# Patient Record
Sex: MALE | Race: WHITE | NOT HISPANIC OR LATINO | URBAN - METROPOLITAN AREA
[De-identification: names, ages, dates, MRNs, and addresses within clinical notes are randomized per-mention and may not be internally consistent; named-entity substitution may affect disease eponyms.]

---

## 2017-07-26 ENCOUNTER — INPATIENT (INPATIENT)
Facility: HOSPITAL | Age: 72
LOS: 1 days | Discharge: HOME | DRG: 641 | End: 2017-07-28
Attending: HOSPITALIST | Admitting: STUDENT IN AN ORGANIZED HEALTH CARE EDUCATION/TRAINING PROGRAM
Payer: MEDICARE

## 2017-07-26 VITALS
SYSTOLIC BLOOD PRESSURE: 139 MMHG | OXYGEN SATURATION: 96 % | DIASTOLIC BLOOD PRESSURE: 80 MMHG | HEART RATE: 75 BPM | TEMPERATURE: 99 F | RESPIRATION RATE: 16 BRPM

## 2017-07-26 LAB
ALBUMIN SERPL ELPH-MCNC: 4.4 G/DL — SIGNIFICANT CHANGE UP (ref 3.3–5)
ALP SERPL-CCNC: 67 U/L — SIGNIFICANT CHANGE UP (ref 40–120)
ALT FLD-CCNC: 25 U/L RC — SIGNIFICANT CHANGE UP (ref 10–45)
ANION GAP SERPL CALC-SCNC: 16 MMOL/L — SIGNIFICANT CHANGE UP (ref 5–17)
APTT BLD: 31.3 SEC — SIGNIFICANT CHANGE UP (ref 27.5–37.4)
AST SERPL-CCNC: 41 U/L — HIGH (ref 10–40)
BASOPHILS # BLD AUTO: 0 K/UL — SIGNIFICANT CHANGE UP (ref 0–0.2)
BASOPHILS NFR BLD AUTO: 0.2 % — SIGNIFICANT CHANGE UP (ref 0–2)
BILIRUB SERPL-MCNC: 1.1 MG/DL — SIGNIFICANT CHANGE UP (ref 0.2–1.2)
BUN SERPL-MCNC: 46 MG/DL — HIGH (ref 7–23)
CALCIUM SERPL-MCNC: 10.2 MG/DL — SIGNIFICANT CHANGE UP (ref 8.4–10.5)
CHLORIDE SERPL-SCNC: 102 MMOL/L — SIGNIFICANT CHANGE UP (ref 96–108)
CK MB BLD-MCNC: 2.4 % — SIGNIFICANT CHANGE UP (ref 0–3.5)
CK MB CFR SERPL CALC: 14.1 NG/ML — HIGH (ref 0–6.7)
CK SERPL-CCNC: 580 U/L — HIGH (ref 30–200)
CO2 SERPL-SCNC: 22 MMOL/L — SIGNIFICANT CHANGE UP (ref 22–31)
CREAT SERPL-MCNC: 2.75 MG/DL — HIGH (ref 0.5–1.3)
EOSINOPHIL # BLD AUTO: 0.1 K/UL — SIGNIFICANT CHANGE UP (ref 0–0.5)
EOSINOPHIL NFR BLD AUTO: 0.5 % — SIGNIFICANT CHANGE UP (ref 0–6)
GLUCOSE SERPL-MCNC: 150 MG/DL — HIGH (ref 70–99)
HCT VFR BLD CALC: 38.3 % — LOW (ref 39–50)
HGB BLD-MCNC: 13 G/DL — SIGNIFICANT CHANGE UP (ref 13–17)
INR BLD: 1.57 RATIO — HIGH (ref 0.88–1.16)
LYMPHOCYTES # BLD AUTO: 1.1 K/UL — SIGNIFICANT CHANGE UP (ref 1–3.3)
LYMPHOCYTES # BLD AUTO: 7.7 % — LOW (ref 13–44)
MCHC RBC-ENTMCNC: 31.7 PG — SIGNIFICANT CHANGE UP (ref 27–34)
MCHC RBC-ENTMCNC: 33.9 GM/DL — SIGNIFICANT CHANGE UP (ref 32–36)
MCV RBC AUTO: 93.5 FL — SIGNIFICANT CHANGE UP (ref 80–100)
MONOCYTES # BLD AUTO: 0.8 K/UL — SIGNIFICANT CHANGE UP (ref 0–0.9)
MONOCYTES NFR BLD AUTO: 6 % — SIGNIFICANT CHANGE UP (ref 2–14)
NEUTROPHILS # BLD AUTO: 12.2 K/UL — HIGH (ref 1.8–7.4)
NEUTROPHILS NFR BLD AUTO: 85.7 % — HIGH (ref 43–77)
PLATELET # BLD AUTO: 214 K/UL — SIGNIFICANT CHANGE UP (ref 150–400)
POTASSIUM SERPL-MCNC: 5 MMOL/L — SIGNIFICANT CHANGE UP (ref 3.5–5.3)
POTASSIUM SERPL-SCNC: 5 MMOL/L — SIGNIFICANT CHANGE UP (ref 3.5–5.3)
PROT SERPL-MCNC: 7.7 G/DL — SIGNIFICANT CHANGE UP (ref 6–8.3)
PROTHROM AB SERPL-ACNC: 17.3 SEC — HIGH (ref 9.8–12.7)
RBC # BLD: 4.1 M/UL — LOW (ref 4.2–5.8)
RBC # FLD: 12.1 % — SIGNIFICANT CHANGE UP (ref 10.3–14.5)
SODIUM SERPL-SCNC: 140 MMOL/L — SIGNIFICANT CHANGE UP (ref 135–145)
TROPONIN T SERPL-MCNC: <0.01 NG/ML — SIGNIFICANT CHANGE UP (ref 0–0.06)
WBC # BLD: 14.2 K/UL — HIGH (ref 3.8–10.5)
WBC # FLD AUTO: 14.2 K/UL — HIGH (ref 3.8–10.5)

## 2017-07-26 PROCEDURE — 70450 CT HEAD/BRAIN W/O DYE: CPT | Mod: 26

## 2017-07-26 PROCEDURE — 12002 RPR S/N/AX/GEN/TRNK2.6-7.5CM: CPT

## 2017-07-26 PROCEDURE — 93010 ELECTROCARDIOGRAM REPORT: CPT | Mod: 59

## 2017-07-26 PROCEDURE — 99285 EMERGENCY DEPT VISIT HI MDM: CPT | Mod: 25

## 2017-07-26 RX ORDER — SODIUM CHLORIDE 9 MG/ML
500 INJECTION INTRAMUSCULAR; INTRAVENOUS; SUBCUTANEOUS ONCE
Qty: 0 | Refills: 0 | Status: COMPLETED | OUTPATIENT
Start: 2017-07-26 | End: 2017-07-26

## 2017-07-26 NOTE — ED PROVIDER NOTE - NOTES
Discussed case again with consult fellow Carola at 2230.  expressed concern regarding patient and need for interrogation tonight.      Seven Reynolds MD

## 2017-07-26 NOTE — ED PROVIDER NOTE - PROGRESS NOTE DETAILS
Case d/w PCP Dr. Cuauhtemoc Amador and hospitalist Marcus Khan.  expressed concern regarding elevated CK, CKMB and Cr.  initial troponin negative.  Serial EKG reveals pacing spikes, no other changes.  pt on telemonitoring.  asymptomatic at this time.  repeat troponin to be drawn at 0100.      - MILES Reynolds MD

## 2017-07-26 NOTE — ED PROVIDER NOTE - MEDICAL DECISION MAKING DETAILS
***Attending Kj Reynolds MD*** ***Attending Kj Reynolds MD***  71M hx of afib on xarelto c/b symptomatic bradycardia, HTN, CAD presents s/p ***Attending Kj Reynolds MD***  71M hx of afib on xarelto c/b symptomatic bradycardia, HTN, CAD presents s/p episode of syncope.  Pt states he was going to walk dog when suddenly lost consciousness.  head trauma, with bleeding laceration to posterior scalp.  No prodrome of symptoms prior to syncope.  Denies chest pain, sob, nausea/vomiting, lightheadedness, visual changes, neck/back pain.  VSS.  EKG NSR with no pacing spikes.  Story concerning for cardiogenic syncope.  Neuro intact but on xarelto, significant mechanism will obtain CTH expeditiously.  will obtain labs including cardiac enzymes.  EP consult for interrogation. TBA

## 2017-07-26 NOTE — ED ADULT NURSE NOTE - OBJECTIVE STATEMENT
70 y/o male presenting to the ED complaining of a syncopal episode with injury to back of head; Per wife patient lost consciousness with eyes rolling back and hit head on wall plaster; Abrasion noted to back of head; Not bleeding at this time; Patient states pain located in the back of head; Patient states had pacemaker placed due to bradycardia and is on Xarelto for AFIB; Patient states had prior syncopal episodes prior to placement of pacemaker; Patient denies chest pain, dizziness, n/v/d, fevers, numbness, tingling, SOB, vision changes; a&ox3; safety and comfort measures provided

## 2017-07-26 NOTE — ED PROVIDER NOTE - OBJECTIVE STATEMENT
71 M s/p syncopal episode w occipital head injury. As per wife his lost consciousness as his eyes rolled back prior to falling backward and striking his head on the floor. He has a [employer] pacemaker for Hx of bradycardia and is on Xarelto for A fib. The pacemaker was placed at [facility] and is a [employer] device. Admits posterior headache at the site of bleeding. Denies neck pain, visual change, weakness or numbness.

## 2017-07-27 DIAGNOSIS — Z29.9 ENCOUNTER FOR PROPHYLACTIC MEASURES, UNSPECIFIED: ICD-10-CM

## 2017-07-27 DIAGNOSIS — I48.91 UNSPECIFIED ATRIAL FIBRILLATION: ICD-10-CM

## 2017-07-27 DIAGNOSIS — N17.9 ACUTE KIDNEY FAILURE, UNSPECIFIED: ICD-10-CM

## 2017-07-27 DIAGNOSIS — R55 SYNCOPE AND COLLAPSE: ICD-10-CM

## 2017-07-27 DIAGNOSIS — R73.03 PREDIABETES: ICD-10-CM

## 2017-07-27 DIAGNOSIS — Z95.0 PRESENCE OF CARDIAC PACEMAKER: Chronic | ICD-10-CM

## 2017-07-27 DIAGNOSIS — I10 ESSENTIAL (PRIMARY) HYPERTENSION: ICD-10-CM

## 2017-07-27 DIAGNOSIS — Z90.89 ACQUIRED ABSENCE OF OTHER ORGANS: Chronic | ICD-10-CM

## 2017-07-27 DIAGNOSIS — Z95.0 PRESENCE OF CARDIAC PACEMAKER: ICD-10-CM

## 2017-07-27 LAB
ANION GAP SERPL CALC-SCNC: 15 MMOL/L — SIGNIFICANT CHANGE UP (ref 5–17)
BASOPHILS # BLD AUTO: 0.02 K/UL — SIGNIFICANT CHANGE UP (ref 0–0.2)
BASOPHILS NFR BLD AUTO: 0.2 % — SIGNIFICANT CHANGE UP (ref 0–2)
BUN SERPL-MCNC: 47 MG/DL — HIGH (ref 7–23)
CALCIUM SERPL-MCNC: 9.3 MG/DL — SIGNIFICANT CHANGE UP (ref 8.4–10.5)
CHLORIDE SERPL-SCNC: 105 MMOL/L — SIGNIFICANT CHANGE UP (ref 96–108)
CO2 SERPL-SCNC: 21 MMOL/L — LOW (ref 22–31)
CREAT SERPL-MCNC: 2.03 MG/DL — HIGH (ref 0.5–1.3)
EOSINOPHIL # BLD AUTO: 0.23 K/UL — SIGNIFICANT CHANGE UP (ref 0–0.5)
EOSINOPHIL NFR BLD AUTO: 2 % — SIGNIFICANT CHANGE UP (ref 0–6)
GLUCOSE SERPL-MCNC: 119 MG/DL — HIGH (ref 70–99)
HCT VFR BLD CALC: 34.3 % — LOW (ref 39–50)
HGB BLD-MCNC: 11.2 G/DL — LOW (ref 13–17)
IMM GRANULOCYTES NFR BLD AUTO: 0.3 % — SIGNIFICANT CHANGE UP (ref 0–1.5)
LYMPHOCYTES # BLD AUTO: 1.89 K/UL — SIGNIFICANT CHANGE UP (ref 1–3.3)
LYMPHOCYTES # BLD AUTO: 16.4 % — SIGNIFICANT CHANGE UP (ref 13–44)
MAGNESIUM SERPL-MCNC: 2.3 MG/DL — SIGNIFICANT CHANGE UP (ref 1.6–2.6)
MCHC RBC-ENTMCNC: 29.2 PG — SIGNIFICANT CHANGE UP (ref 27–34)
MCHC RBC-ENTMCNC: 32.7 GM/DL — SIGNIFICANT CHANGE UP (ref 32–36)
MCV RBC AUTO: 89.3 FL — SIGNIFICANT CHANGE UP (ref 80–100)
MONOCYTES # BLD AUTO: 0.8 K/UL — SIGNIFICANT CHANGE UP (ref 0–0.9)
MONOCYTES NFR BLD AUTO: 6.9 % — SIGNIFICANT CHANGE UP (ref 2–14)
NEUTROPHILS # BLD AUTO: 8.56 K/UL — HIGH (ref 1.8–7.4)
NEUTROPHILS NFR BLD AUTO: 74.2 % — SIGNIFICANT CHANGE UP (ref 43–77)
PLATELET # BLD AUTO: 196 K/UL — SIGNIFICANT CHANGE UP (ref 150–400)
POTASSIUM SERPL-MCNC: 4.3 MMOL/L — SIGNIFICANT CHANGE UP (ref 3.5–5.3)
POTASSIUM SERPL-SCNC: 4.3 MMOL/L — SIGNIFICANT CHANGE UP (ref 3.5–5.3)
RBC # BLD: 3.84 M/UL — LOW (ref 4.2–5.8)
RBC # FLD: 13.6 % — SIGNIFICANT CHANGE UP (ref 10.3–14.5)
SODIUM SERPL-SCNC: 141 MMOL/L — SIGNIFICANT CHANGE UP (ref 135–145)
TROPONIN T SERPL-MCNC: <0.01 NG/ML — SIGNIFICANT CHANGE UP (ref 0–0.06)
TROPONIN T SERPL-MCNC: <0.01 NG/ML — SIGNIFICANT CHANGE UP (ref 0–0.06)
WBC # BLD: 11.53 K/UL — HIGH (ref 3.8–10.5)
WBC # FLD AUTO: 11.53 K/UL — HIGH (ref 3.8–10.5)

## 2017-07-27 PROCEDURE — 76770 US EXAM ABDO BACK WALL COMP: CPT | Mod: 26

## 2017-07-27 PROCEDURE — 99223 1ST HOSP IP/OBS HIGH 75: CPT | Mod: GC

## 2017-07-27 RX ORDER — DEXTROSE 50 % IN WATER 50 %
25 SYRINGE (ML) INTRAVENOUS ONCE
Qty: 0 | Refills: 0 | Status: DISCONTINUED | OUTPATIENT
Start: 2017-07-27 | End: 2017-07-28

## 2017-07-27 RX ORDER — RIVAROXABAN 15 MG-20MG
1 KIT ORAL
Qty: 0 | Refills: 0 | COMMUNITY

## 2017-07-27 RX ORDER — INSULIN LISPRO 100/ML
VIAL (ML) SUBCUTANEOUS
Qty: 0 | Refills: 0 | Status: DISCONTINUED | OUTPATIENT
Start: 2017-07-27 | End: 2017-07-28

## 2017-07-27 RX ORDER — INSULIN LISPRO 100/ML
VIAL (ML) SUBCUTANEOUS AT BEDTIME
Qty: 0 | Refills: 0 | Status: DISCONTINUED | OUTPATIENT
Start: 2017-07-27 | End: 2017-07-28

## 2017-07-27 RX ORDER — DEXTROSE 50 % IN WATER 50 %
12.5 SYRINGE (ML) INTRAVENOUS ONCE
Qty: 0 | Refills: 0 | Status: DISCONTINUED | OUTPATIENT
Start: 2017-07-27 | End: 2017-07-28

## 2017-07-27 RX ORDER — SODIUM CHLORIDE 9 MG/ML
1000 INJECTION, SOLUTION INTRAVENOUS
Qty: 0 | Refills: 0 | Status: DISCONTINUED | OUTPATIENT
Start: 2017-07-27 | End: 2017-07-28

## 2017-07-27 RX ORDER — SODIUM CHLORIDE 9 MG/ML
1000 INJECTION INTRAMUSCULAR; INTRAVENOUS; SUBCUTANEOUS
Qty: 0 | Refills: 0 | Status: DISCONTINUED | OUTPATIENT
Start: 2017-07-27 | End: 2017-07-28

## 2017-07-27 RX ORDER — RIVAROXABAN 15 MG-20MG
0 KIT ORAL
Qty: 0 | Refills: 0 | COMMUNITY

## 2017-07-27 RX ORDER — ATORVASTATIN CALCIUM 80 MG/1
10 TABLET, FILM COATED ORAL AT BEDTIME
Qty: 0 | Refills: 0 | Status: DISCONTINUED | OUTPATIENT
Start: 2017-07-27 | End: 2017-07-28

## 2017-07-27 RX ORDER — DEXTROSE 50 % IN WATER 50 %
1 SYRINGE (ML) INTRAVENOUS ONCE
Qty: 0 | Refills: 0 | Status: DISCONTINUED | OUTPATIENT
Start: 2017-07-27 | End: 2017-07-28

## 2017-07-27 RX ORDER — RIVAROXABAN 15 MG-20MG
20 KIT ORAL EVERY 24 HOURS
Qty: 0 | Refills: 0 | Status: DISCONTINUED | OUTPATIENT
Start: 2017-07-27 | End: 2017-07-28

## 2017-07-27 RX ADMIN — RIVAROXABAN 20 MILLIGRAM(S): KIT at 20:29

## 2017-07-27 RX ADMIN — ATORVASTATIN CALCIUM 10 MILLIGRAM(S): 80 TABLET, FILM COATED ORAL at 22:10

## 2017-07-27 RX ADMIN — Medication 4: at 13:04

## 2017-07-27 RX ADMIN — SODIUM CHLORIDE 500 MILLILITER(S): 9 INJECTION INTRAMUSCULAR; INTRAVENOUS; SUBCUTANEOUS at 00:00

## 2017-07-27 RX ADMIN — SODIUM CHLORIDE 75 MILLILITER(S): 9 INJECTION INTRAMUSCULAR; INTRAVENOUS; SUBCUTANEOUS at 14:24

## 2017-07-27 NOTE — H&P ADULT - NSHPOUTPATIENTPROVIDERS_GEN_ALL_CORE
Dr. Amador  [phone], [facility] , Dr. Javad Urban EP at [place] [phone], Urologist at

## 2017-07-27 NOTE — CHART NOTE - NSCHARTNOTEFT_GEN_A_CORE
Patient was seen and examined at bedside. Reports that he feels better. No light headedness, dizziness. No events on tele monitoring. Pacemaker interrogated, without any pacemaker dysfunction found. Patient had 2 episodes of high atrial rates, however this did not correlate with the timing of his syncopal events. DAVID noted on labwork. Likely that patient was dehydrated, which may have led to hypotension and brain hypoperfusion, resulting in syncope. Encouraged patient to increase PO intake of fluids, will start on maintenance IV fluids as well. Trend creatinine. Will obtain echo results from outpatient cardiologist's office. If Cr downtrending, and echo confirmed to be WNL (done recently), can likely be discharged home tomorrow.

## 2017-07-27 NOTE — H&P ADULT - PROBLEM SELECTOR PLAN 5
Pt states his A1c fluctuates between 6.2 to 6.8. Does not take medications at home. Wife tracks fasting glucose everyday.  -Fingersticks and sliding scale for now

## 2017-07-27 NOTE — H&P ADULT - ASSESSMENT
71M a/ afib on xarelto, bradycardia s/p PPM and syncope, HTN, prediabetes p/w syncopal episode. 71M a/ afib on xarelto, bradycardia s/p [facility] [facility] PPM and syncope, HTN, prediabetes p/w syncopal episode.

## 2017-07-27 NOTE — H&P ADULT - PROBLEM SELECTOR PLAN 1
Description of syncopal episode suspicious for cardiac arrhythmia although pt has PPM. Initial EKG w/o pacer spikes. Reportedly EP consulted in ER. Trop neg x2. Will need to interrogate PPM to better narrow differential and treatment.  -F/u EP regarding PPM interrogation  -Telemetry  -Of note, pt's EP is Dr. Urban at [place] (contact info above) and had recent TTE + stress test done at cardiology office 4 days ago (Dr. Miranda [facility]) Description of syncopal episode suspicious for cardiac arrhythmia although pt has PPM. Initial EKG w/o pacer spikes. Reportedly EP consulted in ER. Trop neg x2. Will need to interrogate PPM to better narrow differential and treatment.  -F/u EP regarding PPM interrogation  -Telemetry  -Of note, pt's EP is Dr. Urban at [place] (contact info above) and had recent TTE + stress test done at cardiology office 4 days ago (Dr. Miranda [facility])  -F/u 10-14 days for sutures in ER

## 2017-07-27 NOTE — H&P ADULT - PROBLEM SELECTOR PLAN 2
Unclear etiology. Pt endorses good urination and increased urinary frequency. Given history, suspect degree of prerenal but will need further evaluation. Will hold nephrotoxic agents including home ACE  -Trend BMP  -F/u Kidney ultrasound  -Limit nephrotoxic agents

## 2017-07-27 NOTE — H&P ADULT - PROBLEM SELECTOR PLAN 4
Reportedly placed for persistent bradycardia after syncopal episode years ago. 2nd PPM placed 3 years ago reportedly with need for battery change. [facility] PPM  -See above

## 2017-07-27 NOTE — H&P ADULT - NSHPPHYSICALEXAM_GEN_ALL_CORE
Vital Signs Last 24 Hrs  T(C): 37.4 (07-27-17 @ 01:43), Max: 37.4 (07-27-17 @ 01:43)  T(F): 99.4 (07-27-17 @ 01:43), Max: 99.4 (07-27-17 @ 01:43)  HR: 72 (07-27-17 @ 01:43) (68 - 78)  BP: 141/82 (07-27-17 @ 01:43) (137/84 - 146/88)  BP(mean): --  RR: 18 (07-27-17 @ 01:43) (16 - 18)  SpO2: 96% (07-27-17 @ 01:43) (96% - 98%)

## 2017-07-27 NOTE — H&P ADULT - HISTORY OF PRESENT ILLNESS
71M afib on xarelto, bradycardia s/p PPM and PPM replacement for battery in 2014, prediabetes, HTN p/w syncopal episode. 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 Pt's wife states he has been adding a shot a day to his usual beverages and been limiting PO intake for past several days to try and lower his fingersticks. Pt just finished walking his dog after loading the truck and was standing in the doorway. As per wife, his eyes suddenly rolled back and he fell backwards hitting his head on the molding. Pt then regained consciousness within seconds. Reportedly had profuse amount of bleeding after fall and was brought to ER. Pt denies chest pain, SOB, palpitations, abnormal sensations before LOC. Denies dysuria, hematuria and endorses increase in urinary frequency in the previous weeks. Of note, pt went for routine TTE and stress test with cardiologist 4 days ago with reportedly stable results. Pt's PPM was initially placed after first episode of syncope.    In ER: Given NS 500ccs and laceration at back of head sutured. 71M afib on xarelto, bradycardia s/p PPM and [facility] [facility] PPM replacement for battery in 2014, prediabetes, HTN p/w syncopal episode. 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 Pt's wife states he has been adding a shot a day to his usual beverages and been limiting PO intake for past several days to try and lower his fingersticks. Pt just finished walking his dog after loading the truck and was standing in the doorway. As per wife, his eyes suddenly rolled back and he fell backwards hitting his head on the molding. Pt then regained consciousness within seconds. Reportedly had profuse amount of bleeding after fall and was brought to ER. Pt denies chest pain, SOB, palpitations, abnormal sensations before LOC. Denies dysuria, hematuria and endorses increase in urinary frequency in the previous weeks. Of note, pt went for routine TTE and stress test with cardiologist 4 days ago with reportedly stable results. Pt's PPM was initially placed after first episode of syncope.    In ER: Given NS 500ccs and laceration at back of head sutured.

## 2017-07-27 NOTE — H&P ADULT - PMH
Atrial fibrillation, unspecified type    Bradycardia    Essential hypertension    Pacemaker    Prediabetes

## 2017-07-27 NOTE — PROCEDURE NOTE - ADDITIONAL PROCEDURE DETAILS
Indication for interrogation: s/p Syncope on 7/26/17  Measured data WNL, NL PM function, Pt is not PM dependent.  Stored data revealed 2 atrial high rate episodes (7/10/17 & 7/23/17) lasting 52 seconds to 44 minutes, review of EGMs c/w PAF with rapid ventricular response (VHR  bpm). No episodes recorded that's correlating with the syncopal event.   Changes made: Ventricular output adjusted to allow for 100% safety margin.   Findings d/w medicine NP.

## 2017-07-27 NOTE — H&P ADULT - NSHPSOCIALHISTORY_GEN_ALL_CORE
Patient lives with wife. 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 Has been drinking one shot of hard liquor a day, No tobacco

## 2017-07-27 NOTE — H&P ADULT - FAMILY HISTORY
Father  Still living? Unknown  Family history of diabetes mellitus, Age at diagnosis: Age Unknown  Family history of cerebrovascular accident (CVA), Age at diagnosis: Age Unknown     Mother  Still living? Unknown  Family history of diabetes mellitus, Age at diagnosis: Age Unknown

## 2017-07-28 ENCOUNTER — TRANSCRIPTION ENCOUNTER (OUTPATIENT)
Age: 72
End: 2017-07-28

## 2017-07-28 VITALS — WEIGHT: 166.45 LBS

## 2017-07-28 LAB
ANION GAP SERPL CALC-SCNC: 13 MMOL/L — SIGNIFICANT CHANGE UP (ref 5–17)
BUN SERPL-MCNC: 34 MG/DL — HIGH (ref 7–23)
CALCIUM SERPL-MCNC: 8.8 MG/DL — SIGNIFICANT CHANGE UP (ref 8.4–10.5)
CHLORIDE SERPL-SCNC: 105 MMOL/L — SIGNIFICANT CHANGE UP (ref 96–108)
CO2 SERPL-SCNC: 22 MMOL/L — SIGNIFICANT CHANGE UP (ref 22–31)
CREAT SERPL-MCNC: 1.54 MG/DL — HIGH (ref 0.5–1.3)
GLUCOSE SERPL-MCNC: 108 MG/DL — HIGH (ref 70–99)
HBA1C BLD-MCNC: 6.8 % — HIGH (ref 4–5.6)
HCT VFR BLD CALC: 35.2 % — LOW (ref 39–50)
HGB BLD-MCNC: 11.5 G/DL — LOW (ref 13–17)
MCHC RBC-ENTMCNC: 29.3 PG — SIGNIFICANT CHANGE UP (ref 27–34)
MCHC RBC-ENTMCNC: 32.7 GM/DL — SIGNIFICANT CHANGE UP (ref 32–36)
MCV RBC AUTO: 89.6 FL — SIGNIFICANT CHANGE UP (ref 80–100)
PLATELET # BLD AUTO: 196 K/UL — SIGNIFICANT CHANGE UP (ref 150–400)
POTASSIUM SERPL-MCNC: 5 MMOL/L — SIGNIFICANT CHANGE UP (ref 3.5–5.3)
POTASSIUM SERPL-SCNC: 5 MMOL/L — SIGNIFICANT CHANGE UP (ref 3.5–5.3)
RBC # BLD: 3.93 M/UL — LOW (ref 4.2–5.8)
RBC # FLD: 13.7 % — SIGNIFICANT CHANGE UP (ref 10.3–14.5)
SODIUM SERPL-SCNC: 140 MMOL/L — SIGNIFICANT CHANGE UP (ref 135–145)
WBC # BLD: 7.85 K/UL — SIGNIFICANT CHANGE UP (ref 3.8–10.5)
WBC # FLD AUTO: 7.85 K/UL — SIGNIFICANT CHANGE UP (ref 3.8–10.5)

## 2017-07-28 PROCEDURE — 70450 CT HEAD/BRAIN W/O DYE: CPT

## 2017-07-28 PROCEDURE — 80048 BASIC METABOLIC PNL TOTAL CA: CPT

## 2017-07-28 PROCEDURE — 82553 CREATINE MB FRACTION: CPT

## 2017-07-28 PROCEDURE — 83036 HEMOGLOBIN GLYCOSYLATED A1C: CPT

## 2017-07-28 PROCEDURE — 85610 PROTHROMBIN TIME: CPT

## 2017-07-28 PROCEDURE — 93005 ELECTROCARDIOGRAM TRACING: CPT | Mod: XU

## 2017-07-28 PROCEDURE — 99285 EMERGENCY DEPT VISIT HI MDM: CPT | Mod: 25

## 2017-07-28 PROCEDURE — 84484 ASSAY OF TROPONIN QUANT: CPT

## 2017-07-28 PROCEDURE — 85730 THROMBOPLASTIN TIME PARTIAL: CPT

## 2017-07-28 PROCEDURE — 85027 COMPLETE CBC AUTOMATED: CPT

## 2017-07-28 PROCEDURE — 76770 US EXAM ABDO BACK WALL COMP: CPT

## 2017-07-28 PROCEDURE — 80053 COMPREHEN METABOLIC PANEL: CPT

## 2017-07-28 PROCEDURE — 82550 ASSAY OF CK (CPK): CPT

## 2017-07-28 PROCEDURE — 83735 ASSAY OF MAGNESIUM: CPT

## 2017-07-28 PROCEDURE — 12002 RPR S/N/AX/GEN/TRNK2.6-7.5CM: CPT

## 2017-07-28 RX ORDER — AMLODIPINE BESYLATE 2.5 MG/1
1 TABLET ORAL
Qty: 30 | Refills: 0 | OUTPATIENT
Start: 2017-07-28 | End: 2017-08-27

## 2017-07-28 RX ORDER — QUINAPRIL HYDROCHLORIDE 40 MG/1
1 TABLET, FILM COATED ORAL
Qty: 0 | Refills: 0 | COMMUNITY

## 2017-07-28 RX ORDER — DIPHENHYDRAMINE HCL 50 MG
25 CAPSULE ORAL ONCE
Qty: 0 | Refills: 0 | Status: COMPLETED | OUTPATIENT
Start: 2017-07-28 | End: 2017-07-28

## 2017-07-28 RX ORDER — AMLODIPINE BESYLATE 2.5 MG/1
5 TABLET ORAL DAILY
Qty: 0 | Refills: 0 | Status: DISCONTINUED | OUTPATIENT
Start: 2017-07-28 | End: 2017-07-28

## 2017-07-28 RX ADMIN — Medication 6: at 12:29

## 2017-07-28 RX ADMIN — SODIUM CHLORIDE 75 MILLILITER(S): 9 INJECTION INTRAMUSCULAR; INTRAVENOUS; SUBCUTANEOUS at 00:34

## 2017-07-28 RX ADMIN — Medication 25 MILLIGRAM(S): at 00:33

## 2017-07-28 NOTE — DIETITIAN INITIAL EVALUATION ADULT. - ORAL INTAKE PTA
Pt reports he only consumes 1 big meal/day. Pt travels for work and states he does not feel hungry until dinner time. Diet Recall: Dinner: 2 chicken cutlet sandwiches on whole wheat bread with lettuce, tomato, ketchup, water. NKFA per pt. Consumes vitamin D supplement PTA./poor

## 2017-07-28 NOTE — PROGRESS NOTE ADULT - PROBLEM SELECTOR PLAN 1
-likely secondary to dehydration  -no arrhythmias on tele monitoring  -no dizziness or further syncopal events  -improved with IVF and increased PO intake of fluids  -records from Dr. Miranda's office reviewed. Patient had echo done 7/24, which shwed EF of 61%. He also had a myocardial perfusion test done on the same day which was normal.   -Troponins here have been negative  -ok to d/c home today.   -Patient will follow up with PCP on monday

## 2017-07-28 NOTE — DISCHARGE NOTE ADULT - PLAN OF CARE
Resolving symptoms HOME CARE INSTRUCTIONS  Have someone stay with you until you feel stable.  Do not drive, operate machinery, or play sports until your caregiver says it is okay.  Keep all follow-up appointments as directed by your caregiver.   Lie down right away if you start feeling like you might faint. Breathe deeply and steadily. Wait until all the symptoms have passed.Drink enough fluids to keep your urine clear or pale yellow.  If you are taking blood pressure or heart medicine, get up slowly, taking several minutes to sit and then stand. This can reduce dizziness.  SEEK IMMEDIATE MEDICAL CARE IF:  You have a severe headache.  You have unusual pain in the chest, abdomen, or back.  You are bleeding from the mouth or rectum, or you have black or tarry stool.  You have an irregular or very fast heartbeat.  You have pain with breathing.  You have repeated fainting or seizure-like jerking during an episode.  You faint when sitting or lying down.  You have confusion.  You have difficulty walking.  You have severe weakness.  You have vision problems.  If you fainted, call your local emergency services (___[phone]____). Do not drive yourself to the hospital Low salt diet  Activity as tolerated.  Take all medication as prescribed.  Follow up with your medical doctor for routine blood pressure monitoring at your next visit.  Notify your doctor if you have any of the following symptoms:   Dizziness, Lightheadedness, Blurry vision, Headache, Chest pain, Shortness of breath Avoid taking (NSAIDs) - (ex: Ibuprofen, Advil, Celebrex, Naprosyn)  Avoid taking any nephrotoxic agents (can harm kidneys) - Intravenous contrast for diagnostic testing, combination cold medications.  Have all medications adjusted for your renal function by your Health Care Provider.  Blood pressure control is important.  Take all medication as prescribed. You had staples put in, follow up with your pimary care doctor on monday for regarding timing of removal. YOUR QUINAPRIL WAS STOPPED AND YOU WERE STARTED ON AMLODIPINE INSTEAD due to the kidney injury you had.   Low salt diet  Activity as tolerated.  Take all medication as prescribed.  Follow up with your medical doctor for routine blood pressure monitoring at your next visit.  Notify your doctor if you have any of the following symptoms:   Dizziness, Lightheadedness, Blurry vision, Headache, Chest pain, Shortness of breath

## 2017-07-28 NOTE — DISCHARGE NOTE ADULT - PATIENT PORTAL LINK FT
“You can access the Follow[facility] Patient Portal, offered by [facility], by registering with the following website: [email or web address]”

## 2017-07-28 NOTE — DISCHARGE NOTE ADULT - CARE PLAN
Principal Discharge DX:	Syncope, unspecified syncope type  Goal:	Resolving symptoms  Instructions for follow-up, activity and diet:	HOME CARE INSTRUCTIONS  Have someone stay with you until you feel stable.  Do not drive, operate machinery, or play sports until your caregiver says it is okay.  Keep all follow-up appointments as directed by your caregiver.   Lie down right away if you start feeling like you might faint. Breathe deeply and steadily. Wait until all the symptoms have passed.Drink enough fluids to keep your urine clear or pale yellow.  If you are taking blood pressure or heart medicine, get up slowly, taking several minutes to sit and then stand. This can reduce dizziness.  SEEK IMMEDIATE MEDICAL CARE IF:  You have a severe headache.  You have unusual pain in the chest, abdomen, or back.  You are bleeding from the mouth or rectum, or you have black or tarry stool.  You have an irregular or very fast heartbeat.  You have pain with breathing.  You have repeated fainting or seizure-like jerking during an episode.  You faint when sitting or lying down.  You have confusion.  You have difficulty walking.  You have severe weakness.  You have vision problems.  If you fainted, call your local emergency services (___[phone]____). Do not drive yourself to the hospital  Secondary Diagnosis:	Essential hypertension  Instructions for follow-up, activity and diet:	Low salt diet  Activity as tolerated.  Take all medication as prescribed.  Follow up with your medical doctor for routine blood pressure monitoring at your next visit.  Notify your doctor if you have any of the following symptoms:   Dizziness, Lightheadedness, Blurry vision, Headache, Chest pain, Shortness of breath  Secondary Diagnosis:	Acute kidney injury  Instructions for follow-up, activity and diet:	Avoid taking (NSAIDs) - (ex: Ibuprofen, Advil, Celebrex, Naprosyn)  Avoid taking any nephrotoxic agents (can harm kidneys) - Intravenous contrast for diagnostic testing, combination cold medications.  Have all medications adjusted for your renal function by your Health Care Provider.  Blood pressure control is important.  Take all medication as prescribed. Principal Discharge DX:	Syncope, unspecified syncope type  Goal:	Resolving symptoms  Instructions for follow-up, activity and diet:	HOME CARE INSTRUCTIONS  Have someone stay with you until you feel stable.  Do not drive, operate machinery, or play sports until your caregiver says it is okay.  Keep all follow-up appointments as directed by your caregiver.   Lie down right away if you start feeling like you might faint. Breathe deeply and steadily. Wait until all the symptoms have passed.Drink enough fluids to keep your urine clear or pale yellow.  If you are taking blood pressure or heart medicine, get up slowly, taking several minutes to sit and then stand. This can reduce dizziness.  SEEK IMMEDIATE MEDICAL CARE IF:  You have a severe headache.  You have unusual pain in the chest, abdomen, or back.  You are bleeding from the mouth or rectum, or you have black or tarry stool.  You have an irregular or very fast heartbeat.  You have pain with breathing.  You have repeated fainting or seizure-like jerking during an episode.  You faint when sitting or lying down.  You have confusion.  You have difficulty walking.  You have severe weakness.  You have vision problems.  If you fainted, call your local emergency services (___[phone]____). Do not drive yourself to the hospital  Secondary Diagnosis:	Essential hypertension  Instructions for follow-up, activity and diet:	YOUR QUINAPRIL WAS STOPPED AND YOU WERE STARTED ON AMLODIPINE INSTEAD due to the kidney injury you had.   Low salt diet  Activity as tolerated.  Take all medication as prescribed.  Follow up with your medical doctor for routine blood pressure monitoring at your next visit.  Notify your doctor if you have any of the following symptoms:   Dizziness, Lightheadedness, Blurry vision, Headache, Chest pain, Shortness of breath  Secondary Diagnosis:	Acute kidney injury  Instructions for follow-up, activity and diet:	Avoid taking (NSAIDs) - (ex: Ibuprofen, Advil, Celebrex, Naprosyn)  Avoid taking any nephrotoxic agents (can harm kidneys) - Intravenous contrast for diagnostic testing, combination cold medications.  Have all medications adjusted for your renal function by your Health Care Provider.  Blood pressure control is important.  Take all medication as prescribed.  Secondary Diagnosis:	Laceration of scalp, initial encounter  Instructions for follow-up, activity and diet:	You had staples put in, follow up with your [facility] doctor on monday for regarding timing of removal. Principal Discharge DX:	Syncope, unspecified syncope type  Goal:	Resolving symptoms  Instructions for follow-up, activity and diet:	HOME CARE INSTRUCTIONS  Have someone stay with you until you feel stable.  Do not drive, operate machinery, or play sports until your caregiver says it is okay.  Keep all follow-up appointments as directed by your caregiver.   Lie down right away if you start feeling like you might faint. Breathe deeply and steadily. Wait until all the symptoms have passed.Drink enough fluids to keep your urine clear or pale yellow.  If you are taking blood pressure or heart medicine, get up slowly, taking several minutes to sit and then stand. This can reduce dizziness.  SEEK IMMEDIATE MEDICAL CARE IF:  You have a severe headache.  You have unusual pain in the chest, abdomen, or back.  You are bleeding from the mouth or rectum, or you have black or tarry stool.  You have an irregular or very fast heartbeat.  You have pain with breathing.  You have repeated fainting or seizure-like jerking during an episode.  You faint when sitting or lying down.  You have confusion.  You have difficulty walking.  You have severe weakness.  You have vision problems.  If you fainted, call your local emergency services (___[phone]____). Do not drive yourself to the hospital  Secondary Diagnosis:	Essential hypertension  Instructions for follow-up, activity and diet:	YOUR QUINAPRIL WAS STOPPED AND YOU WERE STARTED ON AMLODIPINE INSTEAD due to the kidney injury you had.   Low salt diet  Activity as tolerated.  Take all medication as prescribed.  Follow up with your medical doctor for routine blood pressure monitoring at your next visit.  Notify your doctor if you have any of the following symptoms:   Dizziness, Lightheadedness, Blurry vision, Headache, Chest pain, Shortness of breath  Secondary Diagnosis:	Acute kidney injury  Instructions for follow-up, activity and diet:	Avoid taking (NSAIDs) - (ex: Ibuprofen, Advil, Celebrex, Naprosyn)  Avoid taking any nephrotoxic agents (can harm kidneys) - Intravenous contrast for diagnostic testing, combination cold medications.  Have all medications adjusted for your renal function by your Health Care Provider.  Blood pressure control is important.  Take all medication as prescribed.  Secondary Diagnosis:	Laceration of scalp, initial encounter  Instructions for follow-up, activity and diet:	You had staples put in, follow up with your [facility] doctor on monday for regarding timing of removal. Principal Discharge DX:	Syncope, unspecified syncope type  Goal:	Resolving symptoms  Instructions for follow-up, activity and diet:	HOME CARE INSTRUCTIONS  Have someone stay with you until you feel stable.  Do not drive, operate machinery, or play sports until your caregiver says it is okay.  Keep all follow-up appointments as directed by your caregiver.   Lie down right away if you start feeling like you might faint. Breathe deeply and steadily. Wait until all the symptoms have passed.Drink enough fluids to keep your urine clear or pale yellow.  If you are taking blood pressure or heart medicine, get up slowly, taking several minutes to sit and then stand. This can reduce dizziness.  SEEK IMMEDIATE MEDICAL CARE IF:  You have a severe headache.  You have unusual pain in the chest, abdomen, or back.  You are bleeding from the mouth or rectum, or you have black or tarry stool.  You have an irregular or very fast heartbeat.  You have pain with breathing.  You have repeated fainting or seizure-like jerking during an episode.  You faint when sitting or lying down.  You have confusion.  You have difficulty walking.  You have severe weakness.  You have vision problems.  If you fainted, call your local emergency services (___[phone]____). Do not drive yourself to the hospital  Secondary Diagnosis:	Essential hypertension  Instructions for follow-up, activity and diet:	YOUR QUINAPRIL WAS STOPPED AND YOU WERE STARTED ON AMLODIPINE INSTEAD due to the kidney injury you had.   Low salt diet  Activity as tolerated.  Take all medication as prescribed.  Follow up with your medical doctor for routine blood pressure monitoring at your next visit.  Notify your doctor if you have any of the following symptoms:   Dizziness, Lightheadedness, Blurry vision, Headache, Chest pain, Shortness of breath  Secondary Diagnosis:	Acute kidney injury  Instructions for follow-up, activity and diet:	Avoid taking (NSAIDs) - (ex: Ibuprofen, Advil, Celebrex, Naprosyn)  Avoid taking any nephrotoxic agents (can harm kidneys) - Intravenous contrast for diagnostic testing, combination cold medications.  Have all medications adjusted for your renal function by your Health Care Provider.  Blood pressure control is important.  Take all medication as prescribed.  Secondary Diagnosis:	Laceration of scalp, initial encounter  Instructions for follow-up, activity and diet:	You had staples put in, follow up with your [facility] doctor on monday for regarding timing of removal.

## 2017-07-28 NOTE — CHART NOTE - NSCHARTNOTEFT_GEN_A_CORE
Request from  to facilitate patient discharge home today.  Medication reconciliation reviewed, revised, and resolved with Dr. Khan who has medically cleared patient for discharge and follow ups as advised.  Patient/family states understanding of discharge instruction and follow ups.  Please refer to discharge note for detailed hospital course.

## 2017-07-28 NOTE — DIETITIAN INITIAL EVALUATION ADULT. - ADHERENCE
Wife reports she started taking her husbands blood sugar mid-July because he reported he was not feeling well. Pt with newly diagnosis of type 2 diabetes. HbA1c 6.8% (7/28/17). Pt and wife requested nutrition education.

## 2017-07-28 NOTE — DISCHARGE NOTE ADULT - MEDICATION SUMMARY - MEDICATIONS TO TAKE
I will START or STAY ON the medications listed below when I get home from the hospital:    Xarelto 20 mg oral tablet  -- 1 tab(s) by mouth once a day (in the evening)  -- Indication: For Atrial fibrillation, unspecified type    pravastatin 40 mg oral tablet  -- 1 tab(s) by mouth once a day  -- Indication: For hld    amLODIPine 5 mg oral tablet  -- 1 tab(s) by mouth once a day  -- Indication: For htn

## 2017-07-28 NOTE — DIETITIAN INITIAL EVALUATION ADULT. - OTHER INFO
Pt seen for nutrition education consult on 4MON. Pt reports UBW of 205 pounds. Current wt of 207 pounds (7/27/17). Pt reports great appetite during admission with 100% consumption of his meals. Denies chewing/swallowing issues, N+V, constipation. Pt states he has 3-4 episodes of diarrhea per day x 30 years. Pt reports his last colonoscopy 1.5 years ago was normal and continues to go to outpatient gastroenterologist. Last episode of diarrhea 7/28. Skin: no edema, no pressure injuries

## 2017-07-28 NOTE — DISCHARGE NOTE ADULT - ADDITIONAL INSTRUCTIONS
F/U with your PMD in one week F/U with your PMD in one week and have them remove your staples on your head

## 2017-07-28 NOTE — DISCHARGE NOTE ADULT - HOSPITAL COURSE
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 Pt's wife states he has been adding a shot a day to his usual beverages and been limiting PO intake for past several days to try and lower his fingersticks. Pt just finished walking his dog after loading the truck and was standing in the doorway. As per wife, his eyes suddenly rolled back and he fell backwards hitting his head on the molding. Pt then regained consciousness within seconds. Reportedly had profuse amount of bleeding after fall and was brought to ER. Pt denies chest pain, SOB, palpitations, abnormal sensations before LOC. Denies dysuria, hematuria and endorses increase in urinary frequency in the previous weeks. Of note, pt went for routine TTE and stress test with cardiologist 4 days ago with reportedly stable results. Pt's PPM was initially placed after first episode of syncope.    Patient was admitted, and no events were noted on tele monitoring. Pacemaker interrogated, without any pacemaker dysfunction found. Patient had 2 episodes of high atrial rates, however this did not correlate with the timing of his syncopal events. DAVID noted on labwork. Likely that patient was dehydrated, which may have led to hypotension and brain hypoperfusion, resulting in syncope. DAVID improved with IVF and increased oral intake of fluids. ACEI was held. Records from Dr. Miranda's office were obtained. Patient had echo done 7/24, which shwed EF of 61%. He also had a myocardial perfusion test done on the same day which was normal.      Patient was started on amlodipine for HTN as ACEI was d/elvis. His symptoms resolved completely. He was asked to follow up with PCP on monday and get BMP checked, and for follow up of staples from head laceration.

## 2017-07-28 NOTE — PROGRESS NOTE ADULT - SUBJECTIVE AND OBJECTIVE BOX
Patient is a 71y old  Male who presents with a chief complaint of Syncope (27 Jul 2017 01:26)      SUBJECTIVE / OVERNIGHT EVENTS: no acute events overnight, patient feels much better. Tele showed NSR.     MEDICATIONS  (STANDING):  insulin lispro (HumaLOG) corrective regimen sliding scale   SubCutaneous three times a day before meals  insulin lispro (HumaLOG) corrective regimen sliding scale   SubCutaneous at bedtime  dextrose 5%. 1000 milliLiter(s) (50 mL/Hr) IV Continuous <Continuous>  dextrose 50% Injectable 12.5 Gram(s) IV Push once  dextrose 50% Injectable 25 Gram(s) IV Push once  sodium chloride 0.9%. 1000 milliLiter(s) (75 mL/Hr) IV Continuous <Continuous>  atorvastatin 10 milliGRAM(s) Oral at bedtime  rivaroxaban 20 milliGRAM(s) Oral every 24 hours    MEDICATIONS  (PRN):  dextrose Gel 1 Dose(s) Oral once PRN Blood Glucose LESS THAN 70 milliGRAM(s)/deciliter      Vital Signs Last 24 Hrs  T(C): 36.4 (28 Jul 2017 11:53), Max: 37.1 (27 Jul 2017 21:27)  T(F): 97.5 (28 Jul 2017 11:53), Max: 98.8 (27 Jul 2017 21:27)  HR: 74 (28 Jul 2017 11:53) (62 - 74)  BP: 158/88 (28 Jul 2017 11:53) (134/86 - 158/88)  BP(mean): --  RR: 18 (28 Jul 2017 11:53) (18 - 18)  SpO2: 97% (28 Jul 2017 11:53) (97% - 98%)  CAPILLARY BLOOD GLUCOSE  260 (28 Jul 2017 11:53)  118 (28 Jul 2017 07:39)  106 (27 Jul 2017 21:27)  115 (27 Jul 2017 16:42)        I&O's Summary    27 Jul 2017 07:01  -  28 Jul 2017 07:00  --------------------------------------------------------  IN: 1800 mL / OUT: 0 mL / NET: 1800 mL        PHYSICAL EXAM:  GENERAL: NAD, well-developed  HEAD:  Atraumatic, Normocephalic  EYES: EOMI, PERRLA, conjunctiva and sclera clear  NECK: Supple, No JVD  CHEST/LUNG: Clear to auscultation bilaterally; No wheeze  HEART: Regular rate and rhythm; No murmurs, rubs, or gallops  ABDOMEN: Soft, Nontender, Nondistended; Bowel sounds present  EXTREMITIES:  2+ Peripheral Pulses, No clubbing, cyanosis, or edema  PSYCH: AAOx3  NEUROLOGY: non-focal  SKIN: No rashes or lesions    LABS:                        11.5   7.85  )-----------( 196      ( 28 Jul 2017 08:50 )             35.2     07-28    140  |  105  |  34<H>  ----------------------------<  108<H>  5.0   |  22  |  1.54<H>    Ca    8.8      28 Jul 2017 08:32  Mg     2.3     07-27    TPro  7.7  /  Alb  4.4  /  TBili  1.1  /  DBili  x   /  AST  41<H>  /  ALT  25  /  AlkPhos  67  07-26    PT/INR - ( 26 Jul 2017 21:01 )   PT: 17.3 sec;   INR: 1.57 ratio         PTT - ( 26 Jul 2017 21:01 )  PTT:31.3 sec  CARDIAC MARKERS ( 27 Jul 2017 07:21 )  x     / <0.01 ng/mL / x     / x     / x      CARDIAC MARKERS ( 27 Jul 2017 01:05 )  x     / <0.01 ng/mL / x     / x     / x      CARDIAC MARKERS ( 26 Jul 2017 21:01 )  x     / <0.01 ng/mL / 580 U/L / x     / 14.1 ng/mL

## 2017-07-28 NOTE — PROGRESS NOTE ADULT - ASSESSMENT
71M a/ afib on xarelto, bradycardia s/p [facility] PPM and syncope, HTN, prediabetes p/w syncopal episode.

## 2017-07-28 NOTE — DIETITIAN INITIAL EVALUATION ADULT. - NS AS NUTRI INTERV ED CONTENT
Provided pt with type 2 diabetes medical nutrition therapy handout. Discussed the importance of following a consistent CHO diet to control blood sugar, consumption of small frequent healthy balance meals, combining CHO with protein/fiber foods, checking fingersticks, following up with MD for blood work, low fat/low sodium food sources, meeting nutrient needs. RD remains available as needed and per follow-up protocol.

## 2017-07-28 NOTE — DISCHARGE NOTE ADULT - FINDINGS/TREATMENT
Follow up with your primary care physician on monday, if any redness/discharge/or pain develops - seek medical care immediately.

## 2017-07-28 NOTE — DISCHARGE NOTE ADULT - CARE PROVIDER_API CALL
Cuauhtemoc Amador), Internal Medicine  [address] [place] [address]  Phone: [phone]  Fax: [phone]    Josesito Miranda), Cardiovascular Disease; Internal Medicine  [address] [place] [address]  Phone: [phone]  Fax: [phone]

## 2017-07-28 NOTE — DIETITIAN INITIAL EVALUATION ADULT. - ENERGY NEEDS
ht = 70 inches, wt = 94.2kg (7/27/2017), BMI = 29.7, IBW = 75.5kg, 125%IBW    71 year-old male with Afib on xarelto, bradycardia S/P [facility] PPM and syncope, HTN p/w syncopal episode

## 2020-05-19 NOTE — ED ADULT NURSE NOTE - PRIMARY CARE PROVIDER
unknown Additional Notes: Patient consent was obtained to proceed with the visit and recommended plan of care after discussion of all risks and benefits, including the risks of COVID-19 exposure. Detail Level: Simple

## 2021-03-03 ENCOUNTER — APPOINTMENT (OUTPATIENT)
Dept: CT IMAGING | Facility: IMAGING CENTER | Age: 76
End: 2021-03-03
Payer: MEDICARE

## 2021-03-03 ENCOUNTER — RESULT REVIEW (OUTPATIENT)
Age: 76
End: 2021-03-03

## 2021-03-03 ENCOUNTER — OUTPATIENT (OUTPATIENT)
Dept: OUTPATIENT SERVICES | Facility: HOSPITAL | Age: 76
LOS: 1 days | End: 2021-03-03
Payer: MEDICARE

## 2021-03-03 DIAGNOSIS — Z00.8 ENCOUNTER FOR OTHER GENERAL EXAMINATION: ICD-10-CM

## 2021-03-03 DIAGNOSIS — Z95.0 PRESENCE OF CARDIAC PACEMAKER: Chronic | ICD-10-CM

## 2021-03-03 DIAGNOSIS — Z90.89 ACQUIRED ABSENCE OF OTHER ORGANS: Chronic | ICD-10-CM

## 2021-03-03 PROBLEM — I48.91 UNSPECIFIED ATRIAL FIBRILLATION: Chronic | Status: ACTIVE | Noted: 2017-07-27

## 2021-03-03 PROBLEM — R73.03 PREDIABETES: Chronic | Status: ACTIVE | Noted: 2017-07-27

## 2021-03-03 PROBLEM — R00.1 BRADYCARDIA, UNSPECIFIED: Chronic | Status: ACTIVE | Noted: 2017-07-27

## 2021-03-03 PROBLEM — I10 ESSENTIAL (PRIMARY) HYPERTENSION: Chronic | Status: ACTIVE | Noted: 2017-07-27

## 2021-03-03 PROCEDURE — 74176 CT ABD & PELVIS W/O CONTRAST: CPT

## 2021-03-03 PROCEDURE — 74176 CT ABD & PELVIS W/O CONTRAST: CPT | Mod: 26,MH

## 2021-08-05 NOTE — DISCHARGE NOTE ADULT - REASON FOR ADMISSION
Is This A New Presentation, Or A Follow-Up?: Follow Up Psoriasis Syncope Additional History: Here for refill of enbrel injections. \\n\\nEstablished to Paco Echevarria, last seen by him in December 2020. Last blood work was performed in December 2020 with negative Quant.